# Patient Record
Sex: FEMALE | Race: OTHER | Employment: OTHER | URBAN - METROPOLITAN AREA
[De-identification: names, ages, dates, MRNs, and addresses within clinical notes are randomized per-mention and may not be internally consistent; named-entity substitution may affect disease eponyms.]

---

## 2023-09-05 ENCOUNTER — OFFICE VISIT (OUTPATIENT)
Dept: INTERNAL MEDICINE CLINIC | Facility: CLINIC | Age: 63
End: 2023-09-05
Payer: MEDICARE

## 2023-09-05 VITALS
BODY MASS INDEX: 27.79 KG/M2 | TEMPERATURE: 98 F | SYSTOLIC BLOOD PRESSURE: 116 MMHG | HEART RATE: 73 BPM | DIASTOLIC BLOOD PRESSURE: 70 MMHG | WEIGHT: 136 LBS | HEIGHT: 58.66 IN | OXYGEN SATURATION: 98 %

## 2023-09-05 DIAGNOSIS — E11.8 CONTROLLED TYPE 2 DIABETES MELLITUS WITH COMPLICATION, WITHOUT LONG-TERM CURRENT USE OF INSULIN (HCC): ICD-10-CM

## 2023-09-05 DIAGNOSIS — Z12.4 PAP SMEAR FOR CERVICAL CANCER SCREENING: ICD-10-CM

## 2023-09-05 DIAGNOSIS — Z12.11 ENCOUNTER FOR SCREENING FECAL OCCULT BLOOD TESTING: ICD-10-CM

## 2023-09-05 DIAGNOSIS — Z12.31 SCREENING MAMMOGRAM FOR BREAST CANCER: ICD-10-CM

## 2023-09-05 DIAGNOSIS — B35.1 ONYCHOMYCOSIS: ICD-10-CM

## 2023-09-05 DIAGNOSIS — Z11.59 NEED FOR HEPATITIS C SCREENING TEST: ICD-10-CM

## 2023-09-05 DIAGNOSIS — Z13.5 DIABETIC RETINOPATHY SCREENING: ICD-10-CM

## 2023-09-05 DIAGNOSIS — Z13.89 NEPHROPATHY SCREEN: ICD-10-CM

## 2023-09-05 DIAGNOSIS — Z91.89 AT RISK FOR OSTEOPOROSIS: ICD-10-CM

## 2023-09-05 DIAGNOSIS — Z12.11 ENCOUNTER FOR SCREENING COLONOSCOPY: ICD-10-CM

## 2023-09-05 DIAGNOSIS — Z76.89 ESTABLISHING CARE WITH NEW DOCTOR, ENCOUNTER FOR: Primary | ICD-10-CM

## 2023-09-05 PROCEDURE — 3078F DIAST BP <80 MM HG: CPT | Performed by: INTERNAL MEDICINE

## 2023-09-05 PROCEDURE — 99204 OFFICE O/P NEW MOD 45 MIN: CPT | Performed by: INTERNAL MEDICINE

## 2023-09-05 PROCEDURE — 3074F SYST BP LT 130 MM HG: CPT | Performed by: INTERNAL MEDICINE

## 2023-09-05 PROCEDURE — 3008F BODY MASS INDEX DOCD: CPT | Performed by: INTERNAL MEDICINE

## 2023-09-05 RX ORDER — ATORVASTATIN CALCIUM 20 MG/1
20 TABLET, FILM COATED ORAL NIGHTLY
COMMUNITY
End: 2023-09-06

## 2023-09-05 RX ORDER — TELMISARTAN AND HYDROCHLORTHIAZIDE 80; 12.5 MG/1; MG/1
1 TABLET ORAL DAILY
COMMUNITY
End: 2023-09-06

## 2023-09-06 ENCOUNTER — LAB ENCOUNTER (OUTPATIENT)
Dept: LAB | Facility: HOSPITAL | Age: 63
End: 2023-09-06
Attending: INTERNAL MEDICINE
Payer: MEDICARE

## 2023-09-06 ENCOUNTER — PATIENT MESSAGE (OUTPATIENT)
Dept: INTERNAL MEDICINE CLINIC | Facility: CLINIC | Age: 63
End: 2023-09-06

## 2023-09-06 ENCOUNTER — TELEPHONE (OUTPATIENT)
Dept: INTERNAL MEDICINE CLINIC | Facility: CLINIC | Age: 63
End: 2023-09-06

## 2023-09-06 DIAGNOSIS — Z00.00 ROUTINE ADULT HEALTH MAINTENANCE: Primary | ICD-10-CM

## 2023-09-06 DIAGNOSIS — Z13.89 NEPHROPATHY SCREEN: ICD-10-CM

## 2023-09-06 DIAGNOSIS — E11.8 CONTROLLED TYPE 2 DIABETES MELLITUS WITH COMPLICATION, WITHOUT LONG-TERM CURRENT USE OF INSULIN (HCC): ICD-10-CM

## 2023-09-06 DIAGNOSIS — Z12.11 ENCOUNTER FOR SCREENING FECAL OCCULT BLOOD TESTING: ICD-10-CM

## 2023-09-06 LAB
ALBUMIN SERPL-MCNC: 3.6 G/DL (ref 3.4–5)
ALBUMIN/GLOB SERPL: 0.9 {RATIO} (ref 1–2)
ALP LIVER SERPL-CCNC: 96 U/L
ALT SERPL-CCNC: 30 U/L
ANION GAP SERPL CALC-SCNC: 6 MMOL/L (ref 0–18)
AST SERPL-CCNC: 20 U/L (ref 15–37)
BASOPHILS # BLD AUTO: 0.05 X10(3) UL (ref 0–0.2)
BASOPHILS NFR BLD AUTO: 0.8 %
BILIRUB SERPL-MCNC: 0.4 MG/DL (ref 0.1–2)
BUN BLD-MCNC: 14 MG/DL (ref 7–18)
BUN/CREAT SERPL: 22.6 (ref 10–20)
CALCIUM BLD-MCNC: 9.5 MG/DL (ref 8.5–10.1)
CHLORIDE SERPL-SCNC: 106 MMOL/L (ref 98–112)
CHOLEST SERPL-MCNC: 155 MG/DL (ref ?–200)
CO2 SERPL-SCNC: 30 MMOL/L (ref 21–32)
CREAT BLD-MCNC: 0.62 MG/DL
CREAT UR-SCNC: 121 MG/DL
DEPRECATED RDW RBC AUTO: 44.8 FL (ref 35.1–46.3)
EGFRCR SERPLBLD CKD-EPI 2021: 99 ML/MIN/1.73M2 (ref 60–?)
EOSINOPHIL # BLD AUTO: 0.12 X10(3) UL (ref 0–0.7)
EOSINOPHIL NFR BLD AUTO: 2 %
ERYTHROCYTE [DISTWIDTH] IN BLOOD BY AUTOMATED COUNT: 12.7 % (ref 11–15)
EST. AVERAGE GLUCOSE BLD GHB EST-MCNC: 137 MG/DL (ref 68–126)
FASTING PATIENT LIPID ANSWER: YES
FASTING STATUS PATIENT QL REPORTED: YES
GLOBULIN PLAS-MCNC: 4 G/DL (ref 2.8–4.4)
GLUCOSE BLD-MCNC: 103 MG/DL (ref 70–99)
HBA1C MFR BLD: 6.4 % (ref ?–5.7)
HCT VFR BLD AUTO: 41.1 %
HCV AB SERPL QL IA: NONREACTIVE
HDLC SERPL-MCNC: 53 MG/DL (ref 40–59)
HEMOCCULT STL QL: NEGATIVE
HGB BLD-MCNC: 13.3 G/DL
IMM GRANULOCYTES # BLD AUTO: 0.01 X10(3) UL (ref 0–1)
IMM GRANULOCYTES NFR BLD: 0.2 %
LDLC SERPL CALC-MCNC: 70 MG/DL (ref ?–100)
LYMPHOCYTES # BLD AUTO: 1.88 X10(3) UL (ref 1–4)
LYMPHOCYTES NFR BLD AUTO: 31.3 %
MCH RBC QN AUTO: 31.1 PG (ref 26–34)
MCHC RBC AUTO-ENTMCNC: 32.4 G/DL (ref 31–37)
MCV RBC AUTO: 96 FL
MICROALBUMIN UR-MCNC: 0.56 MG/DL
MICROALBUMIN/CREAT 24H UR-RTO: 4.6 UG/MG (ref ?–30)
MONOCYTES # BLD AUTO: 0.38 X10(3) UL (ref 0.1–1)
MONOCYTES NFR BLD AUTO: 6.3 %
NEUTROPHILS # BLD AUTO: 3.57 X10 (3) UL (ref 1.5–7.7)
NEUTROPHILS # BLD AUTO: 3.57 X10(3) UL (ref 1.5–7.7)
NEUTROPHILS NFR BLD AUTO: 59.4 %
NONHDLC SERPL-MCNC: 102 MG/DL (ref ?–130)
OSMOLALITY SERPL CALC.SUM OF ELEC: 295 MOSM/KG (ref 275–295)
PLATELET # BLD AUTO: 225 10(3)UL (ref 150–450)
POTASSIUM SERPL-SCNC: 4 MMOL/L (ref 3.5–5.1)
PROT SERPL-MCNC: 7.6 G/DL (ref 6.4–8.2)
RBC # BLD AUTO: 4.28 X10(6)UL
SODIUM SERPL-SCNC: 142 MMOL/L (ref 136–145)
TRIGL SERPL-MCNC: 196 MG/DL (ref 30–149)
VIT D+METAB SERPL-MCNC: 29.4 NG/ML (ref 30–100)
VLDLC SERPL CALC-MCNC: 30 MG/DL (ref 0–30)
WBC # BLD AUTO: 6 X10(3) UL (ref 4–11)

## 2023-09-06 PROCEDURE — 82570 ASSAY OF URINE CREATININE: CPT

## 2023-09-06 PROCEDURE — 36415 COLL VENOUS BLD VENIPUNCTURE: CPT | Performed by: INTERNAL MEDICINE

## 2023-09-06 PROCEDURE — 85025 COMPLETE CBC W/AUTO DIFF WBC: CPT | Performed by: INTERNAL MEDICINE

## 2023-09-06 PROCEDURE — 83036 HEMOGLOBIN GLYCOSYLATED A1C: CPT | Performed by: INTERNAL MEDICINE

## 2023-09-06 PROCEDURE — 82306 VITAMIN D 25 HYDROXY: CPT

## 2023-09-06 PROCEDURE — 86803 HEPATITIS C AB TEST: CPT | Performed by: INTERNAL MEDICINE

## 2023-09-06 PROCEDURE — 3044F HG A1C LEVEL LT 7.0%: CPT | Performed by: INTERNAL MEDICINE

## 2023-09-06 PROCEDURE — 80053 COMPREHEN METABOLIC PANEL: CPT

## 2023-09-06 PROCEDURE — 82274 ASSAY TEST FOR BLOOD FECAL: CPT

## 2023-09-06 PROCEDURE — 80061 LIPID PANEL: CPT | Performed by: INTERNAL MEDICINE

## 2023-09-06 PROCEDURE — 82043 UR ALBUMIN QUANTITATIVE: CPT

## 2023-09-06 RX ORDER — ATORVASTATIN CALCIUM 20 MG/1
20 TABLET, FILM COATED ORAL NIGHTLY
Refills: 0 | Status: CANCELLED | OUTPATIENT
Start: 2023-09-06

## 2023-09-06 RX ORDER — TELMISARTAN AND HYDROCHLORTHIAZIDE 80; 12.5 MG/1; MG/1
1 TABLET ORAL DAILY
Refills: 0 | Status: CANCELLED | OUTPATIENT
Start: 2023-09-06

## 2023-09-06 RX ORDER — TERBINAFINE HYDROCHLORIDE 250 MG/1
250 TABLET ORAL DAILY
Qty: 90 TABLET | Refills: 0 | Status: SHIPPED | OUTPATIENT
Start: 2023-09-06

## 2023-09-06 RX ORDER — ATORVASTATIN CALCIUM 20 MG/1
20 TABLET, FILM COATED ORAL NIGHTLY
Qty: 90 TABLET | Refills: 1 | Status: SHIPPED | OUTPATIENT
Start: 2023-09-06

## 2023-09-06 RX ORDER — TELMISARTAN AND HYDROCHLORTHIAZIDE 80; 12.5 MG/1; MG/1
1 TABLET ORAL DAILY
Qty: 90 TABLET | Refills: 1 | Status: SHIPPED | OUTPATIENT
Start: 2023-09-06

## 2023-09-06 NOTE — TELEPHONE ENCOUNTER
Daughter of pt is calling stating that pt had a phone consult with gastro, but was told needs to be seen in person. Daughter states that was schedule with nohemi michael but needs a referral for office consult.       Please call and advise

## 2023-09-06 NOTE — TELEPHONE ENCOUNTER
From: Wilbert Cosme  To: Luis Daniel Abarca MD  Sent: 9/6/2023 2:32 PM CDT  Subject: Test Results    Hi  and thank you for the quick response with the test results. There are no questions at this time. I requested refills on all medications and was seeing if we could do a 90 day supply.     Thank You

## 2023-09-06 NOTE — TELEPHONE ENCOUNTER
Spoke with patient's daughter Vanesa Sorenson. She states that mom did not qualify for the phone screen for GI. Family is requesting referral for [facility] since she has the earliest appointment. GI referral placed.

## 2023-09-08 ENCOUNTER — HOSPITAL ENCOUNTER (OUTPATIENT)
Dept: BONE DENSITY | Age: 63
Discharge: HOME | End: 2023-09-08
Attending: INTERNAL MEDICINE
Payer: MEDICARE

## 2023-09-08 DIAGNOSIS — Z91.89 AT RISK FOR OSTEOPOROSIS: ICD-10-CM

## 2023-09-08 PROCEDURE — 77080 DXA BONE DENSITY AXIAL: CPT | Performed by: INTERNAL MEDICINE

## 2023-09-12 ENCOUNTER — HOSPITAL ENCOUNTER (OUTPATIENT)
Dept: MAMMOGRAPHY | Facility: HOSPITAL | Age: 63
Discharge: HOME | End: 2023-09-12
Attending: INTERNAL MEDICINE
Payer: MEDICARE

## 2023-09-12 DIAGNOSIS — Z12.31 SCREENING MAMMOGRAM FOR BREAST CANCER: ICD-10-CM

## 2023-09-12 PROCEDURE — 77067 SCR MAMMO BI INCL CAD: CPT | Performed by: INTERNAL MEDICINE

## 2023-09-12 PROCEDURE — 77063 BREAST TOMOSYNTHESIS BI: CPT | Performed by: INTERNAL MEDICINE

## 2023-09-13 ENCOUNTER — OFFICE VISIT (OUTPATIENT)
Dept: OBGYN CLINIC | Facility: CLINIC | Age: 63
End: 2023-09-13
Payer: MEDICARE

## 2023-09-13 VITALS
SYSTOLIC BLOOD PRESSURE: 118 MMHG | DIASTOLIC BLOOD PRESSURE: 72 MMHG | HEIGHT: 58.66 IN | BODY MASS INDEX: 27.82 KG/M2 | WEIGHT: 136.19 LBS

## 2023-09-13 DIAGNOSIS — Z12.4 SCREENING FOR CERVICAL CANCER: ICD-10-CM

## 2023-09-13 DIAGNOSIS — Z01.419 ENCOUNTER FOR ANNUAL ROUTINE GYNECOLOGICAL EXAMINATION: Primary | ICD-10-CM

## 2023-09-13 PROCEDURE — 3008F BODY MASS INDEX DOCD: CPT | Performed by: OBSTETRICS & GYNECOLOGY

## 2023-09-13 PROCEDURE — 3074F SYST BP LT 130 MM HG: CPT | Performed by: OBSTETRICS & GYNECOLOGY

## 2023-09-13 PROCEDURE — G0101 CA SCREEN;PELVIC/BREAST EXAM: HCPCS | Performed by: OBSTETRICS & GYNECOLOGY

## 2023-09-13 PROCEDURE — 3078F DIAST BP <80 MM HG: CPT | Performed by: OBSTETRICS & GYNECOLOGY

## 2023-09-13 PROCEDURE — 87624 HPV HI-RISK TYP POOLED RSLT: CPT | Performed by: OBSTETRICS & GYNECOLOGY

## 2023-09-13 PROCEDURE — 88175 CYTOPATH C/V AUTO FLUID REDO: CPT | Performed by: OBSTETRICS & GYNECOLOGY

## 2023-09-14 LAB — HPV I/H RISK 1 DNA SPEC QL NAA+PROBE: NEGATIVE

## 2023-09-15 ENCOUNTER — OFFICE VISIT (OUTPATIENT)
Facility: CLINIC | Age: 63
End: 2023-09-15

## 2023-09-15 ENCOUNTER — TELEPHONE (OUTPATIENT)
Facility: CLINIC | Age: 63
End: 2023-09-15

## 2023-09-15 VITALS
HEART RATE: 74 BPM | BODY MASS INDEX: 27.01 KG/M2 | SYSTOLIC BLOOD PRESSURE: 132 MMHG | DIASTOLIC BLOOD PRESSURE: 75 MMHG | WEIGHT: 134 LBS | HEIGHT: 59 IN

## 2023-09-15 DIAGNOSIS — K21.9 GASTROESOPHAGEAL REFLUX DISEASE, UNSPECIFIED WHETHER ESOPHAGITIS PRESENT: Primary | ICD-10-CM

## 2023-09-15 DIAGNOSIS — K21.9 GASTROESOPHAGEAL REFLUX DISEASE, UNSPECIFIED WHETHER ESOPHAGITIS PRESENT: ICD-10-CM

## 2023-09-15 DIAGNOSIS — Z12.11 COLON CANCER SCREENING: Primary | ICD-10-CM

## 2023-09-15 DIAGNOSIS — Z12.11 SCREEN FOR COLON CANCER: ICD-10-CM

## 2023-09-15 DIAGNOSIS — Z12.11 SCREENING FOR COLON CANCER: Primary | ICD-10-CM

## 2023-09-15 PROCEDURE — 3008F BODY MASS INDEX DOCD: CPT

## 2023-09-15 PROCEDURE — 3078F DIAST BP <80 MM HG: CPT

## 2023-09-15 PROCEDURE — 3075F SYST BP GE 130 - 139MM HG: CPT

## 2023-09-15 PROCEDURE — 99204 OFFICE O/P NEW MOD 45 MIN: CPT

## 2023-09-15 RX ORDER — NICOTINE POLACRILEX 4 MG/1
20 GUM, CHEWING ORAL DAILY
Qty: 180 TABLET | Refills: 0 | Status: SHIPPED | OUTPATIENT
Start: 2023-09-15 | End: 2024-03-13

## 2023-09-15 RX ORDER — OMEPRAZOLE 20 MG/1
20 CAPSULE, DELAYED RELEASE ORAL
COMMUNITY

## 2023-09-21 PROBLEM — Z12.11 SCREENING FOR COLON CANCER: Status: ACTIVE | Noted: 2023-09-21

## 2023-09-21 PROCEDURE — 88305 TISSUE EXAM BY PATHOLOGIST: CPT | Performed by: STUDENT IN AN ORGANIZED HEALTH CARE EDUCATION/TRAINING PROGRAM

## 2023-09-21 PROCEDURE — 88312 SPECIAL STAINS GROUP 1: CPT | Performed by: STUDENT IN AN ORGANIZED HEALTH CARE EDUCATION/TRAINING PROGRAM

## 2023-09-22 ENCOUNTER — TELEPHONE (OUTPATIENT)
Facility: CLINIC | Age: 63
End: 2023-09-22

## 2023-09-22 DIAGNOSIS — B96.81 HELICOBACTER PYLORI GASTRITIS: Primary | ICD-10-CM

## 2023-09-22 DIAGNOSIS — K29.70 HELICOBACTER PYLORI GASTRITIS: Primary | ICD-10-CM

## 2023-09-22 RX ORDER — OMEPRAZOLE 20 MG/1
20 CAPSULE, DELAYED RELEASE ORAL
Qty: 28 CAPSULE | Refills: 0 | Status: SHIPPED | OUTPATIENT
Start: 2023-09-22 | End: 2023-10-06

## 2023-09-22 RX ORDER — CLARITHROMYCIN 500 MG/1
500 TABLET, COATED ORAL 2 TIMES DAILY
Qty: 28 TABLET | Refills: 0 | Status: SHIPPED | OUTPATIENT
Start: 2023-09-22 | End: 2023-10-06

## 2023-09-22 RX ORDER — AMOXICILLIN 500 MG/1
1000 TABLET, FILM COATED ORAL 2 TIMES DAILY
Qty: 56 TABLET | Refills: 0 | Status: SHIPPED | OUTPATIENT
Start: 2023-09-22 | End: 2023-10-06

## 2023-09-23 NOTE — TELEPHONE ENCOUNTER
EGD biopsies with H.pylori gastritis identified. She has abdominal discomfort and it could be related to H. Pylori. Over time, H. Pylori can cause gastritis, dyspepsia, anemia and ulcers. Given that she has discomfort H. Pylori should be treated. After treatment, we should repeat h. Pylori breath test in 10 weeks. Will start w/ triple therapy (Omeprazole BID+ Amoxicillin BID+ Clarithromycin BID) x 14 days. (Order placed). **Patient will need to hold atorvastatin while on this regimen, can resume when finished. GI staff:    Can you please call the patient and let the know of the h. Pylori results and that treatment is available at the pharmacy. Please place recall for h. Pylori breath test in 10 weeks. Thank you.

## 2023-09-24 ENCOUNTER — TELEPHONE (OUTPATIENT)
Facility: CLINIC | Age: 63
End: 2023-09-24

## 2023-09-24 NOTE — TELEPHONE ENCOUNTER
----- Message from Kristin Lee MD sent at 9/22/2023 11:28 PM CDT -----  GI staff:    Patient had hyperplastic polyp removed on recent colonoscopy, needs 10 year follow up. Gastric biopsies + for h. Pylori (see separate encounter for this). GI Staff:    Can you please place a recall to have this patient repeat a colonoscopy in 10 years. Thank you.     Kristin Lee MD

## 2023-09-24 NOTE — TELEPHONE ENCOUNTER
Recall colon in 10 years per Dr Jonelle Williamson. Colon done 09/21/2023.   Health maintenance updated and message sent to pt outreach to repeat colonoscopy in 10 years

## 2023-09-25 NOTE — TELEPHONE ENCOUNTER
Pt daughter is calling for test results. She received a call regarding an Rx being sent to the pharmacy but she is unsure on why or if she needs to start this medication now.   Please call

## 2023-09-26 NOTE — TELEPHONE ENCOUNTER
I spoke to the pt's daughter Francy Toribio. Her mother gave verbal permission since she is not on the AMBROSE     Pt is not allergic to antibiotics and I verified the patient's pharmacy     We went over the common side effects of antibiotics such as nausea, diarrhea, loss of appetite, bloating and abdominal discomfort. These are not reasons to stop the antibiotics     Any anaphylactic rxn such as hives, coughing wheezing, tightness in the throat or difficultly breathing, she should proceed to IC/ER and also let us know     Pt advised to start a probiotic such as Align or Florastor to maintain a healthy gut and prevent c diff     We went over the antibiotic regimen including the pantoprazole and bismuth     If clarithromycin is prescribed pt will hold the simvastatin while on the antibiotics     Recall placed to repeat breath test 04/01/2024. Pt leaving the country this Saturday until mid April 2024.

## 2023-09-26 NOTE — TELEPHONE ENCOUNTER
Message sent to pt outreach for the pt to repeat h pylori breath test in mid April 2024 since pt will bee away for the entire winter returning at the beginning of April

## 2023-10-24 ENCOUNTER — TELEPHONE (OUTPATIENT)
Dept: INTERNAL MEDICINE CLINIC | Facility: CLINIC | Age: 63
End: 2023-10-24

## 2023-10-24 DIAGNOSIS — R92.8 ABNORMAL MAMMOGRAM OF RIGHT BREAST: Primary | ICD-10-CM

## 2023-10-24 NOTE — TELEPHONE ENCOUNTER
Patient's daughter was contacted and results discussed, she will call to set up appt for that mammogram and ultrasound      ----- Message from Conchis Rodriguez MD sent at 10/24/2023 12:53 PM CDT -----  Please contact patient and inform that comparisons have been made. They are recommending right diagnostic mammogram and US. I have placed order. She should call and schedule as soon as possible.       PQ

## 2023-11-28 ENCOUNTER — TELEPHONE (OUTPATIENT)
Facility: CLINIC | Age: 63
End: 2023-11-28

## 2023-11-28 NOTE — TELEPHONE ENCOUNTER
Mitali wanted to know if patient should have a follow up lab test for H. Pylori.  Please call.  Thank you.

## 2023-11-29 NOTE — TELEPHONE ENCOUNTER
RN spoke to pt's daughter (regarding pt's dad who is a pt of Dr. Hauser). Daughter states that mom will be back in [place] this week x 1 week and was wondering if she should have h pylori eradication testing done now. Per med list, pt on omeprazole-informed daughter that pt would need to hold med x 2 weeks before doing eradication testing. Daughter states she will verify with pt if she is taking omeprazole; will do eradication testing in April if pt is taking daily.

## 2023-12-01 ENCOUNTER — HOSPITAL ENCOUNTER (OUTPATIENT)
Dept: ULTRASOUND IMAGING | Facility: HOSPITAL | Age: 63
Discharge: HOME | End: 2023-12-01
Attending: INTERNAL MEDICINE
Payer: MEDICARE

## 2023-12-01 ENCOUNTER — HOSPITAL ENCOUNTER (OUTPATIENT)
Dept: MAMMOGRAPHY | Facility: HOSPITAL | Age: 63
Discharge: HOME | End: 2023-12-01
Attending: INTERNAL MEDICINE
Payer: MEDICARE

## 2023-12-01 DIAGNOSIS — R92.8 ABNORMAL MAMMOGRAM: ICD-10-CM

## 2023-12-01 PROCEDURE — 77061 BREAST TOMOSYNTHESIS UNI: CPT | Performed by: INTERNAL MEDICINE

## 2023-12-01 PROCEDURE — 77065 DX MAMMO INCL CAD UNI: CPT | Performed by: INTERNAL MEDICINE

## 2023-12-01 PROCEDURE — 76642 ULTRASOUND BREAST LIMITED: CPT | Performed by: INTERNAL MEDICINE

## 2023-12-07 DIAGNOSIS — R92.8 ABNORMALITY OF RIGHT BREAST ON SCREENING MAMMOGRAM: Primary | ICD-10-CM

## 2023-12-26 ENCOUNTER — TELEPHONE (OUTPATIENT)
Dept: INTERNAL MEDICINE CLINIC | Facility: CLINIC | Age: 63
End: 2023-12-26

## 2023-12-26 NOTE — TELEPHONE ENCOUNTER
Reached daughter, Andreina Bentley (ok per HIPAA) for medication adherence consult. Per insurance report, patient is past due for refill on metformin. Daughter state she thinks patient might be cutting the metformin in half and only taking half tablet every day. She also mentions that her mother lives part time in [facility] and part time here so will sometimes get metformin refilled in [facility]. Did recommend patient get a new script with updated directions on the metformin if she is in fact cutting them in half and PCP ok with that. Daughter plans to confirm with patient. Daughter denies any other questions or concerns with medications at this time.

## 2024-03-05 RX ORDER — TELMISARTAN AND HYDROCHLORTHIAZIDE 80; 12.5 MG/1; MG/1
1 TABLET ORAL DAILY
Qty: 30 TABLET | Refills: 0 | Status: SHIPPED | OUTPATIENT
Start: 2024-03-05 | End: 2024-04-08

## 2024-04-02 ENCOUNTER — TELEPHONE (OUTPATIENT)
Dept: INTERNAL MEDICINE CLINIC | Facility: CLINIC | Age: 64
End: 2024-04-02

## 2024-04-02 ENCOUNTER — TELEPHONE (OUTPATIENT)
Facility: CLINIC | Age: 64
End: 2024-04-02

## 2024-04-02 DIAGNOSIS — Z86.19 HISTORY OF HELICOBACTER PYLORI INFECTION: Primary | ICD-10-CM

## 2024-04-02 DIAGNOSIS — E78.00 HIGH CHOLESTEROL: ICD-10-CM

## 2024-04-02 DIAGNOSIS — B35.1 ONYCHOMYCOSIS: ICD-10-CM

## 2024-04-02 DIAGNOSIS — E11.8 CONTROLLED TYPE 2 DIABETES MELLITUS WITH COMPLICATION, WITHOUT LONG-TERM CURRENT USE OF INSULIN (HCC): Primary | ICD-10-CM

## 2024-04-02 NOTE — TELEPHONE ENCOUNTER
Daughter is calling asking if labs can be entered for blood work to be done before patients appointment scheduled for 04/10.     Please advise.

## 2024-04-06 ENCOUNTER — LAB ENCOUNTER (OUTPATIENT)
Dept: LAB | Facility: HOSPITAL | Age: 64
End: 2024-04-06
Attending: INTERNAL MEDICINE
Payer: MEDICARE

## 2024-04-06 DIAGNOSIS — E78.00 HIGH CHOLESTEROL: ICD-10-CM

## 2024-04-06 DIAGNOSIS — E11.8 CONTROLLED TYPE 2 DIABETES MELLITUS WITH COMPLICATION, WITHOUT LONG-TERM CURRENT USE OF INSULIN (HCC): ICD-10-CM

## 2024-04-06 DIAGNOSIS — B35.1 ONYCHOMYCOSIS: ICD-10-CM

## 2024-04-06 LAB
ALBUMIN SERPL-MCNC: 4.5 G/DL (ref 3.2–4.8)
ALBUMIN/GLOB SERPL: 1.5 {RATIO} (ref 1–2)
ALP LIVER SERPL-CCNC: 57 U/L
ALT SERPL-CCNC: 13 U/L
ANION GAP SERPL CALC-SCNC: 6 MMOL/L (ref 0–18)
AST SERPL-CCNC: 16 U/L (ref ?–34)
BILIRUB SERPL-MCNC: 0.4 MG/DL (ref 0.2–1.1)
BUN BLD-MCNC: 17 MG/DL (ref 9–23)
BUN/CREAT SERPL: 25 (ref 10–20)
CALCIUM BLD-MCNC: 9.8 MG/DL (ref 8.7–10.4)
CHLORIDE SERPL-SCNC: 108 MMOL/L (ref 98–112)
CHOLEST SERPL-MCNC: 168 MG/DL (ref ?–200)
CO2 SERPL-SCNC: 28 MMOL/L (ref 21–32)
CREAT BLD-MCNC: 0.68 MG/DL
EGFRCR SERPLBLD CKD-EPI 2021: 97 ML/MIN/1.73M2 (ref 60–?)
EST. AVERAGE GLUCOSE BLD GHB EST-MCNC: 131 MG/DL (ref 68–126)
FASTING PATIENT LIPID ANSWER: YES
FASTING STATUS PATIENT QL REPORTED: YES
GLOBULIN PLAS-MCNC: 3 G/DL (ref 2.8–4.4)
GLUCOSE BLD-MCNC: 92 MG/DL (ref 70–99)
HBA1C MFR BLD: 6.2 % (ref ?–5.7)
HDLC SERPL-MCNC: 56 MG/DL (ref 40–59)
LDLC SERPL CALC-MCNC: 101 MG/DL (ref ?–100)
NONHDLC SERPL-MCNC: 112 MG/DL (ref ?–130)
OSMOLALITY SERPL CALC.SUM OF ELEC: 295 MOSM/KG (ref 275–295)
POTASSIUM SERPL-SCNC: 3.9 MMOL/L (ref 3.5–5.1)
PROT SERPL-MCNC: 7.5 G/DL (ref 5.7–8.2)
SODIUM SERPL-SCNC: 142 MMOL/L (ref 136–145)
TRIGL SERPL-MCNC: 54 MG/DL (ref 30–149)
VLDLC SERPL CALC-MCNC: 9 MG/DL (ref 0–30)

## 2024-04-06 PROCEDURE — 80053 COMPREHEN METABOLIC PANEL: CPT

## 2024-04-06 PROCEDURE — 80061 LIPID PANEL: CPT

## 2024-04-06 PROCEDURE — 83036 HEMOGLOBIN GLYCOSYLATED A1C: CPT

## 2024-04-06 PROCEDURE — 36415 COLL VENOUS BLD VENIPUNCTURE: CPT

## 2024-04-08 RX ORDER — TELMISARTAN AND HYDROCHLORTHIAZIDE 80; 12.5 MG/1; MG/1
1 TABLET ORAL DAILY
Qty: 30 TABLET | Refills: 0 | Status: SHIPPED | OUTPATIENT
Start: 2024-04-08

## 2024-04-10 ENCOUNTER — OFFICE VISIT (OUTPATIENT)
Dept: INTERNAL MEDICINE CLINIC | Facility: CLINIC | Age: 64
End: 2024-04-10
Payer: MEDICARE

## 2024-04-10 VITALS
WEIGHT: 138 LBS | OXYGEN SATURATION: 99 % | DIASTOLIC BLOOD PRESSURE: 62 MMHG | BODY MASS INDEX: 28.2 KG/M2 | HEIGHT: 58.5 IN | SYSTOLIC BLOOD PRESSURE: 118 MMHG | HEART RATE: 74 BPM | TEMPERATURE: 99 F

## 2024-04-10 DIAGNOSIS — I10 ESSENTIAL HYPERTENSION: ICD-10-CM

## 2024-04-10 DIAGNOSIS — Z79.4 CONTROLLED TYPE 2 DIABETES MELLITUS WITHOUT COMPLICATION, WITH LONG-TERM CURRENT USE OF INSULIN (HCC): ICD-10-CM

## 2024-04-10 DIAGNOSIS — E78.00 HIGH CHOLESTEROL: ICD-10-CM

## 2024-04-10 DIAGNOSIS — Z00.00 ENCOUNTER FOR ANNUAL HEALTH EXAMINATION: ICD-10-CM

## 2024-04-10 DIAGNOSIS — E11.9 CONTROLLED TYPE 2 DIABETES MELLITUS WITHOUT COMPLICATION, WITH LONG-TERM CURRENT USE OF INSULIN (HCC): ICD-10-CM

## 2024-04-10 DIAGNOSIS — Z00.00 MEDICARE ANNUAL WELLNESS VISIT, SUBSEQUENT: Primary | ICD-10-CM

## 2024-04-10 PROBLEM — E11.8 CONTROLLED TYPE 2 DIABETES MELLITUS WITH COMPLICATION, WITHOUT LONG-TERM CURRENT USE OF INSULIN (HCC): Status: ACTIVE | Noted: 2024-04-10

## 2024-04-10 RX ORDER — TELMISARTAN AND HYDROCHLORTHIAZIDE 80; 12.5 MG/1; MG/1
1 TABLET ORAL DAILY
Qty: 90 TABLET | Refills: 1 | Status: SHIPPED | OUTPATIENT
Start: 2024-04-10 | End: 2024-04-10

## 2024-04-10 RX ORDER — OMEPRAZOLE 20 MG/1
20 CAPSULE, DELAYED RELEASE ORAL
Qty: 90 CAPSULE | Refills: 1 | Status: SHIPPED | OUTPATIENT
Start: 2024-04-10

## 2024-04-10 RX ORDER — LOSARTAN POTASSIUM AND HYDROCHLOROTHIAZIDE 12.5; 5 MG/1; MG/1
1 TABLET ORAL DAILY
Qty: 90 TABLET | Refills: 3 | Status: SHIPPED | OUTPATIENT
Start: 2024-04-10 | End: 2025-04-05

## 2024-04-10 RX ORDER — ATORVASTATIN CALCIUM 20 MG/1
20 TABLET, FILM COATED ORAL NIGHTLY
Qty: 90 TABLET | Refills: 1 | Status: SHIPPED | OUTPATIENT
Start: 2024-04-10

## 2024-04-10 NOTE — TELEPHONE ENCOUNTER
I spoke to pt's daughter Mitali    She is aware the breath test is ordered . Her mom stopped taking omeprazole about a week ago for the test and is aware she needs to stop for 2 weeks prior to testing. No food or drink for one hour prior to testing

## 2024-04-16 ENCOUNTER — TELEPHONE (OUTPATIENT)
Dept: INTERNAL MEDICINE CLINIC | Facility: CLINIC | Age: 64
End: 2024-04-16

## 2024-04-17 ENCOUNTER — LAB ENCOUNTER (OUTPATIENT)
Dept: LAB | Facility: REFERENCE LAB | Age: 64
End: 2024-04-17
Attending: INTERNAL MEDICINE
Payer: MEDICARE

## 2024-04-17 DIAGNOSIS — Z86.19 HISTORY OF HELICOBACTER PYLORI INFECTION: ICD-10-CM

## 2024-04-17 PROBLEM — Z12.11 SCREENING FOR COLON CANCER: Status: RESOLVED | Noted: 2023-09-21 | Resolved: 2024-04-17

## 2024-04-17 PROBLEM — E11.8 CONTROLLED TYPE 2 DIABETES MELLITUS WITH COMPLICATION, WITHOUT LONG-TERM CURRENT USE OF INSULIN (HCC): Status: RESOLVED | Noted: 2024-04-10 | Resolved: 2024-04-17

## 2024-04-17 PROBLEM — E11.9 CONTROLLED TYPE 2 DIABETES MELLITUS WITHOUT COMPLICATION, WITH LONG-TERM CURRENT USE OF INSULIN (HCC): Status: ACTIVE | Noted: 2024-04-10

## 2024-04-17 PROBLEM — Z79.4 CONTROLLED TYPE 2 DIABETES MELLITUS WITHOUT COMPLICATION, WITH LONG-TERM CURRENT USE OF INSULIN (HCC): Status: ACTIVE | Noted: 2024-04-10

## 2024-04-17 PROCEDURE — 83013 H PYLORI (C-13) BREATH: CPT

## 2024-04-17 NOTE — PATIENT INSTRUCTIONS
Johanny Moraes's SCREENING SCHEDULE   Tests on this list are recommended by your physician but may not be covered, or covered at this frequency, by your insurer.   Please check with your insurance carrier before scheduling to verify coverage.   PREVENTATIVE SERVICES FREQUENCY &  COVERAGE DETAILS LAST COMPLETION DATE   Diabetes Screening    Fasting Blood Sugar /  Glucose    One screening every 12 months if never tested or if previously tested but not diagnosed with pre-diabetes   One screening every 6 months if diagnosed with pre-diabetes Lab Results   Component Value Date    GLU 92 04/06/2024        Cardiovascular Disease Screening    Lipid Panel  Cholesterol  Lipoprotein (HDL)  Triglycerides Covered every 5 years for all Medicare beneficiaries without apparent signs or symptoms of cardiovascular disease Lab Results   Component Value Date    CHOLEST 168 04/06/2024    HDL 56 04/06/2024     (H) 04/06/2024    TRIG 54 04/06/2024         Electrocardiogram (EKG)   Covered if needed at Welcome to Medicare, and non-screening if indicated for medical reasons -      Ultrasound Screening for Abdominal Aortic Aneurysm (AAA) Covered once in a lifetime for one of the following risk factors   • Men who are 65-75 years old and have ever smoked   • Anyone with a family history -     Colorectal Cancer Screening  Covered for ages 50-85; only need ONE of the following:    Colonoscopy   Covered every 10 years    Covered every 2 years if patient is at high risk or previous colonoscopy was abnormal 09/21/2023    Health Maintenance   Topic Date Due   • Colorectal Cancer Screening  09/21/2033       Flexible Sigmoidoscopy   Covered every 4 years -    Fecal Occult Blood Test Covered annually 09/06/2023   Bone Density Screening    Bone density screening    Covered every 2 years after age 65 if diagnosed with risk of osteoporosis or estrogen deficiency.    Covered yearly for long-term glucocorticoid medication use (Steroids) Last Dexa  Scan:    XR DEXA BONE DENSITOMETRY (CPT=77080) 09/08/2023      No recommendations at this time   Pap and Pelvic    Pap   Covered every 2 years for women at normal risk; Annually if at high risk 09/13/2023  Health Maintenance   Topic Date Due   • Pap Smear  09/13/2026       Chlamydia Annually if high risk 09/06/2023  No recommendations at this time   Screening Mammogram    Mammogram     Recommend annually for all female patients aged 40 and older    One baseline mammogram covered for patients aged 35-39 12/01/2023    Health Maintenance   Topic Date Due   • Mammogram  12/01/2024       Immunizations    Influenza Covered once per flu season  Please get every year 09/27/2023  No recommendations at this time    Pneumococcal Each vaccine (Mpvrszj23 & Aknsfkreb01) covered once after 65 Prevnar 13: -    [id number]: -     No recommendations at this time    Hepatitis B One screening covered for patients with certain risk factors   -  No recommendations at this time    Tetanus Toxoid Not covered by Medicare Part B unless medically necessary (cut with metal); may be covered with your pharmacy prescription benefits -    Tetanus, Diptheria and Pertusis TD and TDaP Not covered by Medicare Part B -  No recommendations at this time    Zoster Not covered by Medicare Part B; may be covered with your pharmacy  prescription benefits -  Zoster Vaccines(1 of 2) Never done     Diabetes      Hemoglobin A1C Annually; if last result is elevated, may be asked to retest more frequently.    Medicare covers every 3 months Lab Results   Component Value Date     (H) 04/06/2024    A1C 6.2 (H) 04/06/2024       No recommendations at this time    Creat/alb ratio Annually Lab Results   Component Value Date    MICROALBCREA 4.6 09/06/2023       LDL Annually Lab Results   Component Value Date     (H) 04/06/2024       Dilated Eye Exam Annually [unfilled]     Annual Monitoring of Persistent Medications (ACE/ARB, digoxin diuretics,  anticonvulsants)    Potassium Annually Lab Results   Component Value Date    K 3.9 04/06/2024         Creatinine   Annually Lab Results   Component Value Date    CREATSERUM 0.68 04/06/2024         BUN Annually Lab Results   Component Value Date    BUN 17 04/06/2024       Drug Serum Conc Annually No results found for: \"DIGOXIN\", \"DIG\", \"VALP\"

## 2024-04-17 NOTE — PROGRESS NOTES
Subjective:   Johanny Moraes is a 63 year old female who presents for a Medicare Subsequent Annual Wellness visit (Pt already had Initial Annual Wellness) and scheduled follow up of multiple significant but stable problems.       History/Other:   Fall Risk Assessment:   She has been screened for Falls and is low risk.      Cognitive Assessment:   She had a completely normal cognitive assessment - see flowsheet entries     Functional Ability/Status:   Johanny Moraes has some abnormal functions as listed below:  She has Vision problems based on screening of functional status.     Advanced Directives:   She does NOT have a Living Will. [Do you have a living will?: No]  She does NOT have a Power of  for Health Care. [Do you have a healthcare power of ?: No]  Not discussed      Patient Active Problem List   Diagnosis    Controlled type 2 diabetes mellitus without complication, with long-term current use of insulin ([facility])    Essential hypertension     Allergies:  She has No Known Allergies.    Current Medications:  Outpatient Medications Marked as Taking for the 4/10/24 encounter (Office Visit) with Ismael Rueda MD   Medication Sig    atorvastatin 20 MG Oral Tab Take 1 tablet (20 mg total) by mouth nightly.    metFORMIN 850 MG Oral Tab Take 1 tablet (850 mg total) by mouth daily with breakfast.    omeprazole 20 MG Oral Capsule Delayed Release Take 1 capsule (20 mg total) by mouth every morning before breakfast.    losartan-hydroCHLOROthiazide 50-12.5 MG Oral Tab Take 1 tablet by mouth daily.       Medical History:  She  has a past medical history of Diabetes mellitus (HCC), Essential hypertension, GERD (gastroesophageal reflux disease), and High cholesterol.  Surgical History:  She  has a past surgical history that includes ; needle biopsy right; and loyda localization wire 1 site right (cpt=19281) (Right, ).   Family History:  Her family history includes Diabetes in her mother;  Hypertension in her sister.  Social History:  She  reports that she has never smoked. She has never been exposed to tobacco smoke. She has never used smokeless tobacco. She reports that she does not drink alcohol and does not use drugs.    Tobacco:  She has never smoked tobacco.    CAGE Alcohol Screen:   Cage screening not needed because reported NO to Alcohol use on social history.      Patient Care Team:  Ismael Rueda MD as PCP - General (Internal Medicine)    Review of Systems  GENERAL: feels well otherwise  SKIN: denies any unusual skin lesions  EYES: denies blurred vision or double vision  HEENT: denies nasal congestion, sinus pain or ST  LUNGS: denies shortness of breath with exertion  CARDIOVASCULAR: denies chest pain on exertion  GI: denies abdominal pain, denies heartburn  : denies dysuria, vaginal discharge or itching, no complaint of urinary incontinence   MUSCULOSKELETAL: denies back pain  NEURO: denies headaches  PSYCHE: denies depression or anxiety  HEMATOLOGIC: denies hx of anemia  ENDOCRINE: denies thyroid history  ALL/ASTHMA: denies hx of allergy or asthma    Objective:   Physical Exam  General Appearance:  Alert, cooperative, no distress, appears stated age   Head:  Normocephalic, without obvious abnormality, atraumatic   Eyes:  PERRL, conjunctiva/corneas clear, EOM's intact both eyes   Ears:  Normal TM's and external ear canals, both ears   Nose: Nares normal, septum midline,mucosa normal, no drainage or sinus tenderness   Throat: Lips, mucosa, and tongue normal; teeth and gums normal   Neck: Supple, symmetrical, trachea midline, no adenopathy;  thyroid: not enlarged, symmetric, no tenderness/mass/nodules; no carotid bruit or JVD   Back:   Symmetric, no curvature, ROM normal, no CVA tenderness   Lungs:   Clear to auscultation bilaterally, respirations unlabored   Heart:  Regular rate and rhythm, S1 and S2 normal, no murmur, rub, or gallop   Abdomen:   Soft, non-tender, bowel sounds active  all four quadrants,  no masses, no organomegaly   Pelvic: Deferred   Extremities: Extremities normal, atraumatic, no cyanosis or edema   Pulses: 2+ and symmetric   Skin: Skin color, texture, turgor normal, no rashes or lesions   Lymph nodes: Cervical, supraclavicular, and axillary nodes normal   Neurologic: Normal       /62 (BP Location: Right arm, Patient Position: Sitting, Cuff Size: adult)   Pulse 74   Temp 98.7 °F (37.1 °C) (Temporal)   Ht 4' 10.5\" (1.486 m)   Wt 138 lb (62.6 kg)   SpO2 99%   BMI 28.35 kg/m²  Estimated body mass index is 28.35 kg/m² as calculated from the following:    Height as of this encounter: 4' 10.5\" (1.486 m).    Weight as of this encounter: 138 lb (62.6 kg).    Medicare Hearing Assessment:   Hearing Screening    Screening Method: Finger Rub, Whisper Test  Finger Rub Result: Pass  Whisper Test Result: Pass         Visual Acuity:   Right Eye Visual Acuity: Corrected Right Eye Chart Acuity: 20/25   Left Eye Visual Acuity: Corrected Left Eye Chart Acuity: 20/25   Both Eyes Visual Acuity: Corrected Both Eyes Chart Acuity: 20/25   Able To Tolerate Visual Acuity: Yes        Assessment & Plan:   Johanny Moraes is a 63 year old female who presents for a Medicare Assessment.     1. Medicare annual wellness visit, subsequent (Primary)  -  During the annual physical exam I spent extensive time discussing medical history including existing conditions, past surgeries, allergies and medications.  Lifestyle factors discussed including diet, exercise and substance abuse including alcohol and tobacco if warranted.  Counseled on screening tests based on age and underlying risk factors which may include but not limited to blood pressure measurement, cholesterol screening, diabetes screening and prostate cancer screening.  Preventative screenings discussed.  Reviewed immunizations.  Sexual health discussed if appropriate.  Family history reviewed.  I addressed any specific concerns or symptoms  that the patient had.      2. High cholesterol  Comments:  Controlled.  ON statin.  Orders:  -     Lipid Panel  3. Controlled type 2 diabetes mellitus without complication, with long-term current use of insulin (HCC)  Comments:  Controlled on metformin.  Orders:  -     Hemoglobin A1C  -     Comp Metabolic Panel (14)  -     CBC With Differential With Platelet  4. Essential hypertension  Comments:  switching to losartan hctz due to cost coverage.  Orders:  -     Losartan Potassium-HCTZ; Take 1 tablet by mouth daily.  Dispense: 90 tablet; Refill: 3  5. Encounter for annual health examination  Other orders  -     Atorvastatin Calcium; Take 1 tablet (20 mg total) by mouth nightly.  Dispense: 90 tablet; Refill: 1  -     metFORMIN HCl; Take 1 tablet (850 mg total) by mouth daily with breakfast.  Dispense: 90 tablet; Refill: 1  -     Discontinue: Telmisartan-HCTZ; Take 1 tablet by mouth daily.  Dispense: 90 tablet; Refill: 1  -     Omeprazole; Take 1 capsule (20 mg total) by mouth every morning before breakfast.  Dispense: 90 capsule; Refill: 1    The patient indicates understanding of these issues and agrees to the plan.  Reinforced healthy diet, lifestyle, and exercise.      Return in about 6 months (around 10/10/2024) for Symptom monitoring, Glucose monitoring.     Ismael Rueda MD, 4/17/2024     Supplementary Documentation:   General Health:  In the past six months, have you lost more than 10 pounds without trying?: 2 - No  Has your appetite been poor?: No  Type of Diet: Balanced  How would you describe your daily physical activity?: Moderate  How would you describe your current health state?: Fair  How do you maintain positive mental well-being?: Visiting Friends;Visiting Family  Have you had any immunizations at another office such as Influenza, Hepatitis B, Tetanus, or Pneumococcal?: Yes       Johanny Moraes's SCREENING SCHEDULE   Tests on this list are recommended by your physician but may not be covered, or  covered at this frequency, by your insurer.   Please check with your insurance carrier before scheduling to verify coverage.   PREVENTATIVE SERVICES FREQUENCY &  COVERAGE DETAILS LAST COMPLETION DATE   Diabetes Screening    Fasting Blood Sugar /  Glucose    One screening every 12 months if never tested or if previously tested but not diagnosed with pre-diabetes   One screening every 6 months if diagnosed with pre-diabetes Lab Results   Component Value Date    GLU 92 04/06/2024        Cardiovascular Disease Screening    Lipid Panel  Cholesterol  Lipoprotein (HDL)  Triglycerides Covered every 5 years for all Medicare beneficiaries without apparent signs or symptoms of cardiovascular disease Lab Results   Component Value Date    CHOLEST 168 04/06/2024    HDL 56 04/06/2024     (H) 04/06/2024    TRIG 54 04/06/2024         Electrocardiogram (EKG)   Covered if needed at Welcome to Medicare, and non-screening if indicated for medical reasons -      Ultrasound Screening for Abdominal Aortic Aneurysm (AAA) Covered once in a lifetime for one of the following risk factors    Men who are 65-75 years old and have ever smoked    Anyone with a family history -     Colorectal Cancer Screening  Covered for ages 50-85; only need ONE of the following:    Colonoscopy   Covered every 10 years    Covered every 2 years if patient is at high risk or previous colonoscopy was abnormal 09/21/2023    Health Maintenance   Topic Date Due    Colorectal Cancer Screening  09/21/2033       Flexible Sigmoidoscopy   Covered every 4 years -    Fecal Occult Blood Test Covered annually 09/06/2023   Bone Density Screening    Bone density screening    Covered every 2 years after age 65 if diagnosed with risk of osteoporosis or estrogen deficiency.    Covered yearly for long-term glucocorticoid medication use (Steroids) Last Dexa Scan:    XR DEXA BONE DENSITOMETRY (CPT=77080) 09/08/2023      No recommendations at this time   Pap and Pelvic    Pap    Covered every 2 years for women at normal risk; Annually if at high risk 09/13/2023  Health Maintenance   Topic Date Due    Pap Smear  09/13/2026       Chlamydia Annually if high risk 09/06/2023  No recommendations at this time   Screening Mammogram    Mammogram     Recommend annually for all female patients aged 40 and older    One baseline mammogram covered for patients aged 35-39 12/01/2023    Health Maintenance   Topic Date Due    Mammogram  12/01/2024       Immunizations    Influenza Covered once per flu season  Please get every year 09/27/2023  No recommendations at this time    Pneumococcal Each vaccine (Ytxmtyl81 & Qgfllewzj80) covered once after 65 Prevnar 13: -    [id number]: -     No recommendations at this time    Hepatitis B One screening covered for patients with certain risk factors   -  No recommendations at this time    Tetanus Toxoid Not covered by Medicare Part B unless medically necessary (cut with metal); may be covered with your pharmacy prescription benefits -    Tetanus, Diptheria and Pertusis TD and TDaP Not covered by Medicare Part B -  No recommendations at this time    Zoster Not covered by Medicare Part B; may be covered with your pharmacy  prescription benefits -  Zoster Vaccines(1 of 2) Never done     Diabetes      Hemoglobin A1C Annually; if last result is elevated, may be asked to retest more frequently.    Medicare covers every 3 months Lab Results   Component Value Date     (H) 04/06/2024    A1C 6.2 (H) 04/06/2024       No recommendations at this time    Creat/alb ratio Annually Lab Results   Component Value Date    MICROALBCREA 4.6 09/06/2023       LDL Annually Lab Results   Component Value Date     (H) 04/06/2024       Dilated Eye Exam Annually Last Diabetic Eye Exam:  Last Dilated Eye Exam: 09/28/23  Eye Exam shows Diabetic Retinopathy?: No       Annual Monitoring of Persistent Medications (ACE/ARB, digoxin diuretics, anticonvulsants)    Potassium Annually Lab  Results   Component Value Date    K 3.9 04/06/2024         Creatinine   Annually Lab Results   Component Value Date    CREATSERUM 0.68 04/06/2024         BUN Annually Lab Results   Component Value Date    BUN 17 04/06/2024       Drug Serum Conc Annually No results found for: \"DIGOXIN\", \"DIG\", \"VALP\"

## 2024-04-19 LAB — H PYLORI BREATH TEST: NEGATIVE

## 2024-04-21 NOTE — PROGRESS NOTES
H. Pylori test negative after treatment. No further testing needed.    "[employer]hart sent to the patient.

## 2024-06-03 ENCOUNTER — HOSPITAL ENCOUNTER (OUTPATIENT)
Dept: MAMMOGRAPHY | Facility: HOSPITAL | Age: 64
Discharge: HOME | End: 2024-06-03
Attending: INTERNAL MEDICINE
Payer: MEDICARE

## 2024-06-03 DIAGNOSIS — R92.8 ABNORMALITY OF RIGHT BREAST ON SCREENING MAMMOGRAM: ICD-10-CM

## 2024-06-03 PROCEDURE — 77065 DX MAMMO INCL CAD UNI: CPT | Performed by: INTERNAL MEDICINE

## 2024-06-03 PROCEDURE — 77061 BREAST TOMOSYNTHESIS UNI: CPT | Performed by: INTERNAL MEDICINE

## 2024-10-07 ENCOUNTER — TELEPHONE (OUTPATIENT)
Dept: INTERNAL MEDICINE CLINIC | Facility: CLINIC | Age: 64
End: 2024-10-07

## 2024-10-07 RX ORDER — ATORVASTATIN CALCIUM 20 MG/1
TABLET, FILM COATED ORAL
Qty: 90 TABLET | Refills: 0 | Status: SHIPPED | OUTPATIENT
Start: 2024-10-07

## 2024-10-07 NOTE — TELEPHONE ENCOUNTER
Patient wants to change her BP medication, pt is currently taking   losartan-hydroCHLOROthiazide 50-12.5 MG Oral Tab  and wants to change it back to Telmisartan-HCTZ 80-12.5 MG Oral Tab

## 2024-10-08 RX ORDER — TELMISARTAN AND HYDROCHLORTHIAZIDE 80; 12.5 MG/1; MG/1
1 TABLET ORAL DAILY
Qty: 90 TABLET | Refills: 1 | Status: SHIPPED | OUTPATIENT
Start: 2024-10-08

## 2024-10-21 ENCOUNTER — OFFICE VISIT (OUTPATIENT)
Dept: INTERNAL MEDICINE CLINIC | Facility: CLINIC | Age: 64
End: 2024-10-21
Payer: MEDICARE

## 2024-10-21 VITALS
OXYGEN SATURATION: 99 % | HEIGHT: 58 IN | HEART RATE: 67 BPM | BODY MASS INDEX: 29.6 KG/M2 | DIASTOLIC BLOOD PRESSURE: 68 MMHG | SYSTOLIC BLOOD PRESSURE: 136 MMHG | WEIGHT: 141 LBS

## 2024-10-21 DIAGNOSIS — Z13.89 NEPHROPATHY SCREEN: ICD-10-CM

## 2024-10-21 DIAGNOSIS — L98.9 SKIN LESION: ICD-10-CM

## 2024-10-21 DIAGNOSIS — E11.9 CONTROLLED TYPE 2 DIABETES MELLITUS WITHOUT COMPLICATION, WITH LONG-TERM CURRENT USE OF INSULIN (HCC): Primary | ICD-10-CM

## 2024-10-21 DIAGNOSIS — L91.8 SKIN TAGS, MULTIPLE ACQUIRED: ICD-10-CM

## 2024-10-21 DIAGNOSIS — Z79.4 CONTROLLED TYPE 2 DIABETES MELLITUS WITHOUT COMPLICATION, WITH LONG-TERM CURRENT USE OF INSULIN (HCC): Primary | ICD-10-CM

## 2024-10-21 PROBLEM — I10 HYPERTENSION: Status: ACTIVE | Noted: 2024-04-10

## 2024-10-21 LAB
CARTRIDGE EXPIRATION DATE: ABNORMAL DATE
CREAT UR-SCNC: 53.8 MG/DL
HEMOGLOBIN A1C: 6.5 % (ref 4.3–5.6)
MICROALBUMIN UR-MCNC: <0.3 MG/DL

## 2024-10-21 PROCEDURE — 82570 ASSAY OF URINE CREATININE: CPT | Performed by: INTERNAL MEDICINE

## 2024-10-21 PROCEDURE — 82043 UR ALBUMIN QUANTITATIVE: CPT | Performed by: INTERNAL MEDICINE

## 2024-10-22 ENCOUNTER — PATIENT MESSAGE (OUTPATIENT)
Dept: INTERNAL MEDICINE CLINIC | Facility: CLINIC | Age: 64
End: 2024-10-22

## 2024-10-22 NOTE — PROGRESS NOTES
[facility] part of [facility]  Return Patient Progress Note      HPI:     Chief Complaint   Patient presents with    Follow - Up     6 mo follow up       Johanny Moraes is a 64 year old female presenting for:    Has a significant  has a past medical history of Diabetes mellitus (HCC), Essential hypertension, GERD (gastroesophageal reflux disease), and High cholesterol.    Here for follow up.      DM II on metformin.      HTN on telmisartan hydrochlorothiazide 80-12.5.      HLD on atorvastatin.       Labs:   CMP:  Lab Results   Component Value Date/Time     04/06/2024 09:34 AM    K 3.9 04/06/2024 09:34 AM     04/06/2024 09:34 AM    CO2 28.0 04/06/2024 09:34 AM    CREATSERUM 0.68 04/06/2024 09:34 AM    CA 9.8 04/06/2024 09:34 AM    GLU 92 04/06/2024 09:34 AM    TP 7.5 04/06/2024 09:34 AM    ALB 4.5 04/06/2024 09:34 AM    ALKPHO 57 04/06/2024 09:34 AM    AST 16 04/06/2024 09:34 AM    ALT 13 04/06/2024 09:34 AM    BILT 0.4 04/06/2024 09:34 AM        CBC:  Lab Results   Component Value Date    WBC 6.0 09/06/2023    HGB 13.3 09/06/2023    HCT 41.1 09/06/2023    .0 09/06/2023    NEPERCENT 59.4 09/06/2023    LYPERCENT 31.3 09/06/2023    MOPERCENT 6.3 09/06/2023    EOPERCENT 2.0 09/06/2023    BAPERCENT 0.8 09/06/2023    NE 3.57 09/06/2023    LYMABS 1.88 09/06/2023    MOABSO 0.38 09/06/2023    EOABSO 0.12 09/06/2023    BAABSO 0.05 09/06/2023          Hemoglobin A1C, Microalbumin  Lab Results   Component Value Date/Time    A1C 6.5 (A) 10/21/2024 10:44 AM        Lipid panel  Lab Results   Component Value Date/Time    CHOLEST 168 04/06/2024 09:34 AM    HDL 56 04/06/2024 09:34 AM    TRIG 54 04/06/2024 09:34 AM     (H) 04/06/2024 09:34 AM    NONHDLC 112 04/06/2024 09:34 AM        Medications:  Current Outpatient Medications   Medication Sig Dispense Refill    Telmisartan-HCTZ 80-12.5 MG Oral Tab Take 1 tablet by mouth daily. 90 tablet 1    atorvastatin 20 MG Oral Tab TOME 1 TABLETA POR  VIA ORAL TODOS LOS TANNER POR LA NOCHE 90 tablet 0    omeprazole 20 MG Oral Capsule Delayed Release TOME 1 CAPSULA POR VIA ORAL TODOS LOS TANNER EN LA MANANA ANTES DEL DESAYUNO 90 capsule 0    metFORMIN 850 MG Oral Tab Take 1 tablet (850 mg total) by mouth daily with breakfast. 90 tablet 1      PMH:  Past Medical History:    Diabetes mellitus (HCC)    Essential hypertension    GERD (gastroesophageal reflux disease)    High cholesterol               REVIEW OF SYSTEMS:   Review of Systems   Constitutional:  Negative for chills, fatigue, fever and unexpected weight change.   HENT:  Negative for congestion, ear pain, hearing loss, rhinorrhea, sinus pain and sore throat.    Eyes:  Negative for pain, redness and visual disturbance.   Respiratory:  Negative for apnea, cough, chest tightness, shortness of breath and wheezing.    Cardiovascular:  Negative for chest pain, palpitations and leg swelling.   Gastrointestinal:  Negative for abdominal distention, abdominal pain, blood in stool, constipation and nausea.   Endocrine: Negative for cold intolerance, heat intolerance and polyuria.   Genitourinary:  Negative for dysuria, hematuria and urgency.   Musculoskeletal:  Negative for arthralgias, back pain, gait problem, joint swelling, myalgias and neck pain.   Skin:  Negative for rash and wound.   Allergic/Immunologic: Negative for food allergies and immunocompromised state.   Neurological:  Negative for dizziness, seizures, facial asymmetry, speech difficulty, weakness, light-headedness, numbness and headaches.   Hematological:  Negative for adenopathy. Does not bruise/bleed easily.   Psychiatric/Behavioral:  Negative for behavioral problems, sleep disturbance and suicidal ideas. The patient is not nervous/anxious.             PHYSICAL EXAM:   /68   Pulse 67   Ht 4' 10\" (1.473 m)   Wt 141 lb (64 kg)   SpO2 99%   BMI 29.47 kg/m²  Estimated body mass index is 29.47 kg/m² as calculated from the following:    Height as of  this encounter: 4' 10\" (1.473 m).    Weight as of this encounter: 141 lb (64 kg).     Wt Readings from Last 3 Encounters:   10/21/24 141 lb (64 kg)   04/10/24 138 lb (62.6 kg)   09/15/23 134 lb (60.8 kg)       Physical Exam  Vitals reviewed.   Constitutional:       General: She is not in acute distress.     Appearance: She is well-developed.   HENT:      Head: Normocephalic and atraumatic.   Eyes:      Conjunctiva/sclera: Conjunctivae normal.      Pupils: Pupils are equal, round, and reactive to light.   Neck:      Thyroid: No thyromegaly.   Cardiovascular:      Rate and Rhythm: Normal rate and regular rhythm.      Heart sounds: Normal heart sounds, S1 normal and S2 normal. No murmur heard.     No friction rub. No gallop.   Pulmonary:      Effort: Pulmonary effort is normal. No respiratory distress.      Breath sounds: Normal breath sounds. No wheezing or rales.   Chest:      Chest wall: No tenderness.   Abdominal:      General: Bowel sounds are normal. There is no distension.      Palpations: Abdomen is soft. There is no mass.      Tenderness: There is no abdominal tenderness. There is no guarding or rebound.   Musculoskeletal:         General: No tenderness. Normal range of motion.      Cervical back: Normal range of motion.   Lymphadenopathy:      Cervical: No cervical adenopathy.   Skin:     General: Skin is warm.      Findings: No erythema or rash.   Neurological:      Mental Status: She is alert and oriented to person, place, and time.      Cranial Nerves: No cranial nerve deficit.      Deep Tendon Reflexes: Reflexes are normal and symmetric.   Psychiatric:         Behavior: Behavior normal.         Thought Content: Thought content normal.         Judgment: Judgment normal.         Bilateral barefoot skin diabetic exam is normal, visualized feet and the appearance is normal.  Bilateral monofilament/sensation of both feet is normal.  Pulsation pedal pulse exam of both lower legs/feet is normal as  well.          ASSESSMENT AND PLAN:   Patient is a 64 year old female who presents primarily presents for:    (E11.9,  Z79.4) Controlled type 2 diabetes mellitus without complication, with long-term current use of insulin ([facility])  (primary encounter diagnosis)  Plan: Ophthalmology Referral - In Network, POC         Glycohemoglobin [33580]          On metformin.  A1C 6.5.      (L98.9) Skin lesion  Plan: Derm Referral - In Network            (L91.8) Skin tags, multiple acquired  Plan: Derm Referral - In Network            (Z13.89) Nephropathy screen  Plan: Microalb/Creat Ratio, Random Urine                    Health Maintenance:    Health Maintenance   Topic Date Due    Zoster Vaccines (1 of 2) Never done    COVID-19 Vaccine (6 - 2023-24 season) 09/01/2024    Diabetes Care Foot Exam  09/05/2024    Diabetes Care Dilated Eye Exam  09/28/2024    Diabetes Care: GFR  04/06/2025    Diabetes Care A1C  04/21/2025    Mammogram  06/03/2025    Diabetes Care: Microalb/Creat Ratio  10/21/2025    Colorectal Cancer Screening  09/21/2033    Influenza Vaccine  Completed    DEXA Scan  Completed    MA Annual Health Assessment  Completed    Annual Depression Screening  Completed    Fall Risk Screening (Annual)  Completed    Pneumococcal Vaccine: 65+ Years  Completed         Meds & Refills for this Visit:  Requested Prescriptions      No prescriptions requested or ordered in this encounter       Orders Placed This Encounter   Procedures    Microalb/Creat Ratio, Random Urine    POC Glycohemoglobin [48496]    High Dose Fluzone trivalent influenza, 65 yrs+ PFS [98243]       Imaging & Consults:  INFLUENZA VAC HIGH DOSE PRSV FREE  OPHTHALMOLOGY - INTERNAL  DERM - INTERNAL          No follow-ups on file.  Important follow up notes/labs for next visit      Patient indicates understanding of the above recommendations and agrees to the above plan.  Reasurrance and education provided. All questions answered.    Notified to call with any questions,  complications, allergies, or worsening or changing symptoms as well as any side effects or complications from the treatments .  Red flags/ ER precautions discussed.    If diagnostic labs or imaging ordered advised patient to contact my office for results  24-48 hours after completion    This note was dictated using dragon speech recognition transcription software.  Typographical and transcription errors may be present.  Please call if any questions.             Ismael Rueda MD  [facility]

## 2024-10-23 ENCOUNTER — TELEPHONE (OUTPATIENT)
Dept: INTERNAL MEDICINE CLINIC | Facility: CLINIC | Age: 64
End: 2024-10-23

## 2024-10-23 DIAGNOSIS — Z12.9 SCREENING FOR CANCER: Primary | ICD-10-CM

## 2024-10-23 DIAGNOSIS — R92.8 ABNORMAL FINDINGS ON DIAGNOSTIC IMAGING OF BREAST: ICD-10-CM

## 2024-10-23 DIAGNOSIS — Z12.31 SCREENING MAMMOGRAM FOR BREAST CANCER: ICD-10-CM

## 2024-10-23 DIAGNOSIS — Z87.898 HISTORY OF ABNORMAL MAMMOGRAM: ICD-10-CM

## 2024-10-25 ENCOUNTER — TELEPHONE (OUTPATIENT)
Dept: INTERNAL MEDICINE CLINIC | Facility: CLINIC | Age: 64
End: 2024-10-25

## 2024-10-25 DIAGNOSIS — L91.8 SKIN TAGS, MULTIPLE ACQUIRED: Primary | ICD-10-CM

## 2024-10-25 DIAGNOSIS — L98.9 SKIN LESION: ICD-10-CM

## 2024-10-25 NOTE — TELEPHONE ENCOUNTER
Patient requesting a new Dermatology referral since Dr. Herr does not have soon appointments.     New Dermatology external referral for Dr. Kay sent to the managed care department. Patient has scheduled appointment for 11/18/24.

## 2024-11-13 ENCOUNTER — HOSPITAL ENCOUNTER (OUTPATIENT)
Dept: MAMMOGRAPHY | Facility: HOSPITAL | Age: 64
Discharge: HOME | End: 2024-11-13
Attending: INTERNAL MEDICINE
Payer: MEDICARE

## 2024-11-13 DIAGNOSIS — Z12.31 VISIT FOR SCREENING MAMMOGRAM: ICD-10-CM

## 2024-11-13 DIAGNOSIS — R92.8 ABNORMAL FINDINGS ON DIAGNOSTIC IMAGING OF BREAST: ICD-10-CM

## 2024-11-13 PROCEDURE — 77067 SCR MAMMO BI INCL CAD: CPT | Performed by: INTERNAL MEDICINE

## 2024-11-13 PROCEDURE — 77063 BREAST TOMOSYNTHESIS BI: CPT | Performed by: INTERNAL MEDICINE

## 2024-11-18 ENCOUNTER — OFFICE VISIT (OUTPATIENT)
Dept: DERMATOLOGY CLINIC | Facility: CLINIC | Age: 64
End: 2024-11-18
Payer: MEDICARE

## 2024-11-18 DIAGNOSIS — L82.1 SEBORRHEIC KERATOSES: Primary | ICD-10-CM

## 2024-11-18 DIAGNOSIS — D22.9 BENIGN MOLE: ICD-10-CM

## 2024-11-18 DIAGNOSIS — L82.0 SEBORRHEIC KERATOSIS, INFLAMED: ICD-10-CM

## 2024-11-18 PROCEDURE — 99204 OFFICE O/P NEW MOD 45 MIN: CPT | Performed by: STUDENT IN AN ORGANIZED HEALTH CARE EDUCATION/TRAINING PROGRAM

## 2024-11-18 PROCEDURE — 1159F MED LIST DOCD IN RCRD: CPT | Performed by: STUDENT IN AN ORGANIZED HEALTH CARE EDUCATION/TRAINING PROGRAM

## 2024-11-18 PROCEDURE — 1160F RVW MEDS BY RX/DR IN RCRD: CPT | Performed by: STUDENT IN AN ORGANIZED HEALTH CARE EDUCATION/TRAINING PROGRAM

## 2024-11-18 NOTE — PROGRESS NOTES
New Patient    Referred by: Ismael Rueda MD  [address]  [address]  [place] [address]    CHIEF COMPLAINT: Lesion of concern     HISTORY OF PRESENT ILLNESS: Johanny Moraes is a 64 year old female here for evaluation of lesion of concern.    1. Growth   Location: L Cheek, Back of neck, L Axilla   Duration: Years   Signs and symptoms: Itching; has grown   Current treatment: None  Past treatments: None       Personal Dermatologic History  History of skin cancer: No  History of  atypical moles: No    FAMILY HISTORY:  History of melanoma: No    Past Medical History  Past Medical History:    Diabetes mellitus (HCC)    Essential hypertension    GERD (gastroesophageal reflux disease)    High cholesterol       REVIEW OF SYSTEMS:  Constitutional: Denies fever, chills, unintentional weight loss.   Skin as per HPI    Medications  Current Outpatient Medications   Medication Sig Dispense Refill    Telmisartan-HCTZ 80-12.5 MG Oral Tab Take 1 tablet by mouth daily. 90 tablet 1    atorvastatin 20 MG Oral Tab TOME 1 TABLETA POR VIA ORAL TODOS LOS TANNER POR LA NOCHE 90 tablet 0    omeprazole 20 MG Oral Capsule Delayed Release TOME 1 CAPSULA POR VIA ORAL TODOS LOS TANNER EN LA MANANA ANTES DEL DESAYUNO 90 capsule 0    metFORMIN 850 MG Oral Tab Take 1 tablet (850 mg total) by mouth daily with breakfast. 90 tablet 1       PHYSICAL EXAM:  General: awake, alert, no acute distress  Neuropsych: appropriate mood and affect  Eyes: Sclerae anicteric, without conjunctival injection, eyelids unremarkable  Skin: Skin exam was performed today including the following: face and trunk. Pertinent findings include:   - with brown stuck on papules  - with a regular brown papule  - with an inflamed brown stuck on papule    ASSESSMENT & PLAN:  Pathophysiology of diagnoses discussed with patient.  Therapeutic options reviewed. Risks, benefits, and alternatives discussed with patient. Instructions reviewed at length.    #Seborrheic Keratoses  -  Reassured patient regarding benign nature of lesions. No treatment but observation at this time. Follow-up for concerning physical changes or new symptoms.    #Inflamed seborrheic keratosis  - Reassured regarding benign nature of lesion   - Cryotherapy today. Medically necessary as lesion inflamed and irritated.    - Cryosurgery of non-malignant lesion(s)  - Risks, benefits, alternatives and personnel required for cryosurgery reviewed with patient. Pt verbalizes understanding and wishes to proceed.   - Cryosurgery performed with Liquid Nitrogen via cryostat spray gun to ISK. 1 lesion(s) treated.   - Patient tolerated well and wound care discussed.     #Benign nevus  - Reassured regarding benign nature. No treatment necessary unless symptomatic/changing.   - Recommend sun protection with SPF 30 or higher, sun protective clothing such as wide brimmed hats and long sleeves. Recommend avoiding midday sun (10 am- 3 pm).  .    Return to clinic: as needed or sooner if something concerning arises     Benny Kay MD

## 2025-01-03 RX ORDER — ATORVASTATIN CALCIUM 20 MG/1
TABLET, FILM COATED ORAL
Qty: 90 TABLET | Refills: 1 | Status: SHIPPED | OUTPATIENT
Start: 2025-01-03

## 2025-02-11 ENCOUNTER — PATIENT MESSAGE (OUTPATIENT)
Dept: INTERNAL MEDICINE CLINIC | Facility: CLINIC | Age: 65
End: 2025-02-11

## 2025-02-11 DIAGNOSIS — Z79.4 CONTROLLED TYPE 2 DIABETES MELLITUS WITHOUT COMPLICATION, WITH LONG-TERM CURRENT USE OF INSULIN (HCC): ICD-10-CM

## 2025-02-11 DIAGNOSIS — E11.9 CONTROLLED TYPE 2 DIABETES MELLITUS WITHOUT COMPLICATION, WITH LONG-TERM CURRENT USE OF INSULIN (HCC): ICD-10-CM

## 2025-02-11 DIAGNOSIS — Z13.89 NEPHROPATHY SCREEN: Primary | ICD-10-CM

## 2025-02-11 DIAGNOSIS — Z00.00 PREVENTATIVE HEALTH CARE: ICD-10-CM

## 2025-04-07 ENCOUNTER — LAB ENCOUNTER (OUTPATIENT)
Dept: LAB | Facility: HOSPITAL | Age: 65
End: 2025-04-07
Attending: INTERNAL MEDICINE
Payer: MEDICARE

## 2025-04-07 LAB
ALBUMIN SERPL-MCNC: 4.5 G/DL (ref 3.2–4.8)
ALBUMIN/GLOB SERPL: 1.7 {RATIO} (ref 1–2)
ALP LIVER SERPL-CCNC: 96 U/L
ALT SERPL-CCNC: 44 U/L
ANION GAP SERPL CALC-SCNC: 8 MMOL/L (ref 0–18)
AST SERPL-CCNC: 33 U/L (ref ?–34)
BASOPHILS # BLD AUTO: 0.04 X10(3) UL (ref 0–0.2)
BASOPHILS NFR BLD AUTO: 0.9 %
BILIRUB SERPL-MCNC: 0.4 MG/DL (ref 0.2–1.1)
BUN BLD-MCNC: 14 MG/DL (ref 9–23)
BUN/CREAT SERPL: 22.2 (ref 10–20)
CALCIUM BLD-MCNC: 9.1 MG/DL (ref 8.7–10.4)
CHLORIDE SERPL-SCNC: 105 MMOL/L (ref 98–112)
CHOLEST SERPL-MCNC: 183 MG/DL (ref ?–200)
CO2 SERPL-SCNC: 28 MMOL/L (ref 21–32)
CREAT BLD-MCNC: 0.63 MG/DL
CREAT UR-SCNC: 84.5 MG/DL
DEPRECATED RDW RBC AUTO: 43.5 FL (ref 35.1–46.3)
EGFRCR SERPLBLD CKD-EPI 2021: 98 ML/MIN/1.73M2 (ref 60–?)
EOSINOPHIL # BLD AUTO: 0.11 X10(3) UL (ref 0–0.7)
EOSINOPHIL NFR BLD AUTO: 2.5 %
ERYTHROCYTE [DISTWIDTH] IN BLOOD BY AUTOMATED COUNT: 12.7 % (ref 11–15)
EST. AVERAGE GLUCOSE BLD GHB EST-MCNC: 146 MG/DL (ref 68–126)
FASTING PATIENT LIPID ANSWER: YES
FASTING STATUS PATIENT QL REPORTED: YES
GLOBULIN PLAS-MCNC: 2.7 G/DL (ref 2–3.5)
GLUCOSE BLD-MCNC: 107 MG/DL (ref 70–99)
HBA1C MFR BLD: 6.7 % (ref ?–5.7)
HCT VFR BLD AUTO: 41 %
HDLC SERPL-MCNC: 50 MG/DL (ref 40–59)
HGB BLD-MCNC: 13.2 G/DL
IMM GRANULOCYTES # BLD AUTO: 0.01 X10(3) UL (ref 0–1)
IMM GRANULOCYTES NFR BLD: 0.2 %
LDLC SERPL CALC-MCNC: 105 MG/DL (ref ?–100)
LYMPHOCYTES # BLD AUTO: 1.45 X10(3) UL (ref 1–4)
LYMPHOCYTES NFR BLD AUTO: 32.7 %
MCH RBC QN AUTO: 29.8 PG (ref 26–34)
MCHC RBC AUTO-ENTMCNC: 32.2 G/DL (ref 31–37)
MCV RBC AUTO: 92.6 FL
MICROALBUMIN UR-MCNC: <0.3 MG/DL
MONOCYTES # BLD AUTO: 0.47 X10(3) UL (ref 0.1–1)
MONOCYTES NFR BLD AUTO: 10.6 %
NEUTROPHILS # BLD AUTO: 2.36 X10 (3) UL (ref 1.5–7.7)
NEUTROPHILS # BLD AUTO: 2.36 X10(3) UL (ref 1.5–7.7)
NEUTROPHILS NFR BLD AUTO: 53.1 %
NONHDLC SERPL-MCNC: 133 MG/DL (ref ?–130)
OSMOLALITY SERPL CALC.SUM OF ELEC: 293 MOSM/KG (ref 275–295)
PLATELET # BLD AUTO: 185 10(3)UL (ref 150–450)
POTASSIUM SERPL-SCNC: 4.1 MMOL/L (ref 3.5–5.1)
PROT SERPL-MCNC: 7.2 G/DL (ref 5.7–8.2)
RBC # BLD AUTO: 4.43 X10(6)UL
SODIUM SERPL-SCNC: 141 MMOL/L (ref 136–145)
TRIGL SERPL-MCNC: 159 MG/DL (ref 30–149)
VLDLC SERPL CALC-MCNC: 27 MG/DL (ref 0–30)
WBC # BLD AUTO: 4.4 X10(3) UL (ref 4–11)

## 2025-04-07 PROCEDURE — 80061 LIPID PANEL: CPT | Performed by: INTERNAL MEDICINE

## 2025-04-07 PROCEDURE — 80053 COMPREHEN METABOLIC PANEL: CPT | Performed by: INTERNAL MEDICINE

## 2025-04-07 PROCEDURE — 36415 COLL VENOUS BLD VENIPUNCTURE: CPT | Performed by: INTERNAL MEDICINE

## 2025-04-07 PROCEDURE — 82570 ASSAY OF URINE CREATININE: CPT | Performed by: INTERNAL MEDICINE

## 2025-04-07 PROCEDURE — 82043 UR ALBUMIN QUANTITATIVE: CPT | Performed by: INTERNAL MEDICINE

## 2025-04-07 PROCEDURE — 83036 HEMOGLOBIN GLYCOSYLATED A1C: CPT | Performed by: INTERNAL MEDICINE

## 2025-04-07 PROCEDURE — 85025 COMPLETE CBC W/AUTO DIFF WBC: CPT | Performed by: INTERNAL MEDICINE

## 2025-04-08 ENCOUNTER — TELEPHONE (OUTPATIENT)
Dept: INTERNAL MEDICINE CLINIC | Facility: CLINIC | Age: 65
End: 2025-04-08

## 2025-04-08 NOTE — TELEPHONE ENCOUNTER
RN checking on Ophthalmology referral for Johanny since her daughter Mitali  ( HIPAA authorized) daughter called to inquire. RN informed her referral for Johanny had been authorized.     Authorized referral faxed to Dr. Villavicencio's office at [phone].

## 2025-04-10 ENCOUNTER — OFFICE VISIT (OUTPATIENT)
Age: 65
End: 2025-04-10
Payer: MEDICARE

## 2025-04-10 VITALS
HEART RATE: 69 BPM | OXYGEN SATURATION: 99 % | DIASTOLIC BLOOD PRESSURE: 70 MMHG | TEMPERATURE: 97 F | WEIGHT: 140 LBS | SYSTOLIC BLOOD PRESSURE: 120 MMHG | HEIGHT: 58 IN | BODY MASS INDEX: 29.39 KG/M2

## 2025-04-10 DIAGNOSIS — Z00.00 MEDICARE ANNUAL WELLNESS VISIT, SUBSEQUENT: Primary | ICD-10-CM

## 2025-04-10 DIAGNOSIS — I10 PRIMARY HYPERTENSION: ICD-10-CM

## 2025-04-10 DIAGNOSIS — Z79.4 CONTROLLED TYPE 2 DIABETES MELLITUS WITHOUT COMPLICATION, WITH LONG-TERM CURRENT USE OF INSULIN (HCC): ICD-10-CM

## 2025-04-10 DIAGNOSIS — E11.9 CONTROLLED TYPE 2 DIABETES MELLITUS WITHOUT COMPLICATION, WITH LONG-TERM CURRENT USE OF INSULIN (HCC): ICD-10-CM

## 2025-04-10 DIAGNOSIS — Z00.00 ENCOUNTER FOR ANNUAL HEALTH EXAMINATION: ICD-10-CM

## 2025-04-10 PROCEDURE — 3061F NEG MICROALBUMINURIA REV: CPT | Performed by: INTERNAL MEDICINE

## 2025-04-10 PROCEDURE — 3074F SYST BP LT 130 MM HG: CPT | Performed by: INTERNAL MEDICINE

## 2025-04-10 PROCEDURE — 96160 PT-FOCUSED HLTH RISK ASSMT: CPT | Performed by: INTERNAL MEDICINE

## 2025-04-10 PROCEDURE — 3078F DIAST BP <80 MM HG: CPT | Performed by: INTERNAL MEDICINE

## 2025-04-10 PROCEDURE — 1160F RVW MEDS BY RX/DR IN RCRD: CPT | Performed by: INTERNAL MEDICINE

## 2025-04-10 PROCEDURE — 1159F MED LIST DOCD IN RCRD: CPT | Performed by: INTERNAL MEDICINE

## 2025-04-10 PROCEDURE — G0439 PPPS, SUBSEQ VISIT: HCPCS | Performed by: INTERNAL MEDICINE

## 2025-04-10 PROCEDURE — 3008F BODY MASS INDEX DOCD: CPT | Performed by: INTERNAL MEDICINE

## 2025-04-10 PROCEDURE — 1170F FXNL STATUS ASSESSED: CPT | Performed by: INTERNAL MEDICINE

## 2025-04-10 PROCEDURE — 1126F AMNT PAIN NOTED NONE PRSNT: CPT | Performed by: INTERNAL MEDICINE

## 2025-04-10 PROCEDURE — 3044F HG A1C LEVEL LT 7.0%: CPT | Performed by: INTERNAL MEDICINE

## 2025-04-10 RX ORDER — FLUTICASONE PROPIONATE 50 MCG
2 SPRAY, SUSPENSION (ML) NASAL DAILY
Qty: 1 EACH | Refills: 1 | Status: SHIPPED | OUTPATIENT
Start: 2025-04-10 | End: 2026-04-05

## 2025-04-18 NOTE — PROGRESS NOTES
Subjective:   Johanny Moraes is a 64 year old female who presents for a MA AHA (Medicare Advantage Annual Health Assessment) and Subsequent Annual Wellness visit (Pt already had Initial Annual Wellness) and scheduled follow up of multiple significant but stable problems.   History of Present Illness              History/Other:   Fall Risk Assessment:   She has been screened for Falls and is low risk.      Cognitive Assessment:   She had a completely normal cognitive assessment - see flowsheet entries     Functional Ability/Status:   Johanny Moraes has a completely normal functional assessment. See flowsheet for details.      Depression Screening (PHQ):  PHQ-2 SCORE: 0  , done 4/10/2025             Advanced Directives:   She does NOT have a Living Will. [Do you have a living will?: (Patient-Rptd) No]  She does NOT have a Power of  for Health Care. [Do you have a healthcare power of ?: (Patient-Rptd) No]  Not discussed      Patient Active Problem List   Diagnosis    Controlled type 2 diabetes mellitus without complication, with long-term current use of insulin ([facility])    Hypertension     Allergies:  She has no known allergies.    Current Medications:  Outpatient Medications Marked as Taking for the 4/10/25 encounter (Office Visit) with Ismael Rueda MD   Medication Sig    fluticasone propionate 50 MCG/ACT Nasal Suspension 2 sprays by Each Nare route daily.    METFORMIN 850 MG Oral Tab TAKE 1 TABLET BY MOUTH DAILY WITH BREAKFAST.    atorvastatin 20 MG Oral Tab TOME 1 TABLETA POR VIA ORAL TODOS LOS TANNER POR LA NOCHE    omeprazole 20 MG Oral Capsule Delayed Release TOME 1 CAPSULA POR VIA ORAL TODOS LOS TANNER EN LA MANANA ANTES DEL DESAYUNO    Telmisartan-HCTZ 80-12.5 MG Oral Tab Take 1 tablet by mouth daily.       Medical History:  She  has a past medical history of Diabetes mellitus (HCC), Essential hypertension, GERD (gastroesophageal reflux disease), and High cholesterol.  Surgical History:  She  has  a past surgical history that includes ; needle biopsy right; and loyda localization wire 1 site right (cpt=19281) (Right, ).   Family History:  Her family history includes Diabetes in her mother; Hypertension in her sister.  Social History:  She  reports that she has never smoked. She has never been exposed to tobacco smoke. She has never used smokeless tobacco. She reports that she does not drink alcohol and does not use drugs.    Tobacco:  She has never smoked tobacco.    CAGE Alcohol Screen:   CAGE screening score of 0 on 4/10/2025, showing low risk of alcohol abuse.      Patient Care Team:  Ismael Rueda MD as PCP - General (Internal Medicine)    Review of Systems  GENERAL: feels well otherwise  SKIN: denies any unusual skin lesions  EYES: denies blurred vision or double vision  HEENT: denies nasal congestion, sinus pain or ST  LUNGS: denies shortness of breath with exertion  CARDIOVASCULAR: denies chest pain on exertion  GI: denies abdominal pain, denies heartburn  : denies dysuria, vaginal discharge or itching, no complaint of urinary incontinence   MUSCULOSKELETAL: denies back pain  NEURO: denies headaches  PSYCHE: denies depression or anxiety  HEMATOLOGIC: denies hx of anemia  ENDOCRINE: denies thyroid history  ALL/ASTHMA: denies hx of allergy or asthma    Objective:   Physical Exam  General Appearance:  Alert, cooperative, no distress, appears stated age   Head:  Normocephalic, without obvious abnormality, atraumatic   Eyes:  PERRL, conjunctiva/corneas clear, EOM's intact both eyes   Ears:  Normal TM's and external ear canals, both ears   Nose: Nares normal, septum midline,mucosa normal, no drainage or sinus tenderness   Throat: Lips, mucosa, and tongue normal; teeth and gums normal   Neck: Supple, symmetrical, trachea midline, no adenopathy;  thyroid: not enlarged, symmetric, no tenderness/mass/nodules; no carotid bruit or JVD   Back:   Symmetric, no curvature, ROM normal, no CVA  tenderness   Lungs:   Clear to auscultation bilaterally, respirations unlabored   Heart:  Regular rate and rhythm, S1 and S2 normal, no murmur, rub, or gallop   Abdomen:   Soft, non-tender, bowel sounds active all four quadrants,  no masses, no organomegaly   Pelvic: Deferred   Extremities: Extremities normal, atraumatic, no cyanosis or edema   Pulses: 2+ and symmetric   Skin: Skin color, texture, turgor normal, no rashes or lesions   Lymph nodes: Cervical, supraclavicular, and axillary nodes normal   Neurologic: Normal       /70   Pulse 69   Temp 97.3 °F (36.3 °C)   Ht 4' 10\" (1.473 m)   Wt 140 lb (63.5 kg)   SpO2 99%   BMI 29.26 kg/m²  Estimated body mass index is 29.26 kg/m² as calculated from the following:    Height as of this encounter: 4' 10\" (1.473 m).    Weight as of this encounter: 140 lb (63.5 kg).    Medicare Hearing Assessment:   Hearing Screening    Screening Method: Whisper Test  Whisper Test Result: Pass         Visual Acuity:   Right Eye Visual Acuity: Uncorrected Right Eye Chart Acuity: 20/20   Left Eye Visual Acuity: Uncorrected Left Eye Chart Acuity: 20/20   Both Eyes Visual Acuity: Uncorrected Both Eyes Chart Acuity: 20/20   Able To Tolerate Visual Acuity: Yes        Assessment & Plan:   Johanny Moraes is a 64 year old female who presents for a Medicare Assessment.     1. Medicare annual wellness visit, subsequent (Primary)  -  During the annual physical exam I spent extensive time discussing medical history including existing conditions, past surgeries, allergies and medications.  Lifestyle factors discussed including diet, exercise and substance abuse including alcohol and tobacco if warranted.  Counseled on screening tests based on age and underlying risk factors which may include but not limited to blood pressure measurement, cholesterol screening, diabetes screening and prostate cancer screening.  Preventative screenings discussed.  Reviewed immunizations.  Sexual health  discussed if appropriate.  Family history reviewed.  I addressed any specific concerns or symptoms that the patient had.      2. Controlled type 2 diabetes mellitus without complication, with long-term current use of insulin (HCC)  -  Controlled A1c.  ON metformin.      3. Primary hypertension  -  On telmisartan hydrochlorothiazide    4. Encounter for annual health examination  Other orders  -     Fluticasone Propionate; 2 sprays by Each Nare route daily.  Dispense: 1 each; Refill: 1            The patient indicates understanding of these issues and agrees to the plan.  Reinforced healthy diet, lifestyle, and exercise.      No follow-ups on file.     Ismael Rueda MD, 4/18/2025     Supplementary Documentation:   General Health:  In the past six months, have you lost more than 10 pounds without trying?: (Patient-Rptd) 2 - No  Has your appetite been poor?: (Patient-Rptd) No  Type of Diet: (Patient-Rptd) Other  How does the patient maintain a good energy level?: (Patient-Rptd) Daily Walks  How would you describe your daily physical activity?: (Patient-Rptd) Moderate  How would you describe your current health state?: (Patient-Rptd) Good  How do you maintain positive mental well-being?: (Patient-Rptd) Games, Visiting Friends, Visiting Family  On a scale of 0 to 10, with 0 being no pain and 10 being severe pain, what is your pain level?: (Patient-Rptd) 0 - (None)  In the past six months, have you experienced urine leakage?: (Patient-Rptd) 1-Yes  At any time do you feel concerned for the safety/well-being of yourself and/or your children, in your home or elsewhere?: (Patient-Rptd) No  Have you had any immunizations at another office such as Influenza, Hepatitis B, Tetanus, or Pneumococcal?: (Patient-Rptd) Yes    Health Maintenance   Topic Date Due    Zoster Vaccines (1 of 2) Never done    COVID-19 Vaccine (6 - 2024-25 season) 09/01/2024    Annual Well Visit  01/01/2025    Diabetes Care: Foot Exam (Annual)  01/01/2025     Diabetes Care A1C  10/07/2025    Mammogram  11/13/2025    Diabetes Care: GFR  04/07/2026    Diabetes Care Dilated Eye Exam  04/09/2026    Colorectal Cancer Screening  09/21/2033    Influenza Vaccine  Completed    DEXA Scan  Completed    Annual Depression Screening  Completed    Fall Risk Screening (Annual)  Completed    Diabetes Care: Microalb/Creat Ratio (Annual)  Completed    Pneumococcal Vaccine: 50+ Years  Completed    Meningococcal B Vaccine  Aged Out

## 2025-07-08 RX ORDER — TELMISARTAN AND HYDROCHLORTHIAZIDE 80; 12.5 MG/1; MG/1
1 TABLET ORAL DAILY
Qty: 90 TABLET | Refills: 3 | Status: SHIPPED | OUTPATIENT
Start: 2025-07-08

## 2025-07-08 RX ORDER — ATORVASTATIN CALCIUM 20 MG/1
20 TABLET, FILM COATED ORAL NIGHTLY
Qty: 90 TABLET | Refills: 3 | Status: SHIPPED | OUTPATIENT
Start: 2025-07-08

## 2025-07-08 RX ORDER — OMEPRAZOLE 20 MG/1
20 CAPSULE, DELAYED RELEASE ORAL
Qty: 90 CAPSULE | Refills: 3 | Status: SHIPPED | OUTPATIENT
Start: 2025-07-08

## 2025-07-08 NOTE — TELEPHONE ENCOUNTER
Refill passed per Clinic protocol.  Requested Prescriptions   Pending Prescriptions Disp Refills    atorvastatin 20 MG Oral Tab [Pharmacy Med Name: ATORVASTATIN 20 MG TABLET] 90 tablet 1       Cholesterol Medication Protocol Failed - 7/8/2025  6:21 AM        Failed - Medication is active on med list        Passed - ALT < 80     Lab Results   Component Value Date    ALT 44 04/07/2025             Passed - ALT resulted within past year        Passed - Lipid panel within past 12 months     Lab Results   Component Value Date    CHOLEST 183 04/07/2025    TRIG 159 (H) 04/07/2025    HDL 50 04/07/2025     (H) 04/07/2025    VLDL 27 04/07/2025    NONHDLC 133 (H) 04/07/2025             Passed - In person appointment or virtual visit in the past 12 mos or appointment in next 3 mos     Recent Outpatient Visits              2 months ago Medicare annual wellness visit, subsequent    [facility] Ismael Rueda MD    Office Visit    7 months ago Seborrheic keratoses    [facility] [place] Michalik, Daniel, MD    Office Visit    8 months ago Controlled type 2 diabetes mellitus without complication, with long-term current use of insulin ([facility])    [facility] [place] Ismael Rueda MD    Office Visit    1 year ago Medicare annual wellness visit, subsequent    [facility] [place] Ismael Rueda MD    Office Visit    1 year ago Colon cancer screening    [facility] Catalina Pressley APRN    Office Visit          Future Appointments         Provider Department Appt Notes    In 3 months Ismael Rueda MD [facility] 6 month follow up                      metFORMIN 850 MG Oral Tab [Pharmacy Med Name: METFORMIN  MG TABLET] 90 tablet 1     Sig: Take 1 tablet (850 mg total) by mouth  daily with breakfast.       Diabetes Medication Protocol Passed - 7/8/2025  6:21 AM        Passed - Last A1C < 7.5 and within past 6 months     Lab Results   Component Value Date    A1C 6.7 (H) 04/07/2025             Passed - In person appointment or virtual visit in the past 6 mos or appointment in next 3 mos     Recent Outpatient Visits              2 months ago Medicare annual wellness visit, subsequent    [facility], [email or web address] [place]    Office Visit    7 months ago Seborrheic keratoses    [facility], [place] Michalik, Daniel, MD    Office Visit    8 months ago Controlled type 2 diabetes mellitus without complication, with long-term current use of insulin (HCC)    [facility], Ismael Grullon MD    Office Visit    1 year ago Medicare annual wellness visit, subsequent    [facility], Ismael Grullon MD    Office Visit    1 year ago Colon cancer screening    [facility] [place]    Office Visit          Future Appointments         Provider Department Appt Notes    In 3 months Ismael Rueda MD [facility] 6 month follow up                    Passed - Microalbumin procedure in past 12 months or taking ACE/ARB        Passed - EGFRCR or GFRNAA > 50     GFR Evaluation  EGFRCR: 98 , resulted on 4/7/2025          Passed - GFR in the past 12 months        Passed - Medication is active on med list          omeprazole 20 MG Oral Capsule Delayed Release [Pharmacy Med Name: OMEPRAZOLE DR 20 MG CAPSULE] 90 capsule 1       Gastrointestional Medication Protocol Failed - 7/8/2025  6:21 AM        Failed - Medication is active on med list        Passed - In person appointment or virtual visit in the past 12 mos or appointment in next 3 mos     Recent  Outpatient Visits              2 months ago Medicare annual wellness visit, subsequent    [facility], [facility] Ismael Rueda MD    Office Visit    7 months ago Seborrheic keratoses    [facility] [place] Michalik, Daniel, MD    Office Visit    8 months ago Controlled type 2 diabetes mellitus without complication, with long-term current use of insulin ([facility])    [facility] Ismael Grullon MD    Office Visit    1 year ago Medicare annual wellness visit, subsequent    [facility] Ismael Grullon [facility]    Office Visit    1 year ago Colon cancer screening    [facility] Catalina Pressley APRN    Office Visit          Future Appointments         Provider Department Appt Notes    In 3 months [facility] 6 month follow up                      Telmisartan-HCTZ 80-12.5 MG Oral Tab [Pharmacy Med Name: TELMISARTAN-HCTZ 80-12.5 MG TB] 90 tablet 1     Sig: Take 1 tablet by mouth daily.       Hypertension Medications Protocol Passed - 7/8/2025  6:21 AM        Passed - CMP or BMP in past 12 months        Passed - Last BP reading less than 140/90     BP Readings from Last 1 Encounters:   04/10/25 120/70               Passed - In person appointment or virtual visit in the past 12 mos or appointment in next 3 mos     Recent Outpatient Visits              2 months ago Medicare annual wellness visit, subsequent    [facility], [facility] Ismael Grullon MD    Office Visit    7 months ago Seborrheic keratoses    [facility] [place] Michalik, Daniel, MD    Office Visit    8 months ago Controlled type 2 diabetes mellitus without complication, with long-term current use of insulin ([facility])    [facility]    Office Visit    1 year ago Medicare annual wellness visit, subsequent    [facility], [facility], Ismael Grullon MD    Office Visit    1 year ago Colon cancer screening    [facility]    [facility] Visit          Future Appointments         Provider Department Appt Notes    In 3 months Ismael Rueda MD [facility], [facility] 6 month follow up                    Passed - EGFRCR or GFRNAA > 50     GFR Evaluation  EGFRCR: 98 , resulted on 4/7/2025          Passed - Medication is active on med list